# Patient Record
Sex: MALE | Race: WHITE | Employment: OTHER | URBAN - METROPOLITAN AREA
[De-identification: names, ages, dates, MRNs, and addresses within clinical notes are randomized per-mention and may not be internally consistent; named-entity substitution may affect disease eponyms.]

---

## 2017-02-13 PROBLEM — Z86.19 HISTORY OF SHINGLES: Status: ACTIVE | Noted: 2017-02-13

## 2018-01-22 PROBLEM — M47.819 SPINAL ARTHRITIS: Status: ACTIVE | Noted: 2018-01-22

## 2018-01-22 PROBLEM — I77.810 ASCENDING AORTA DILATATION (HCC): Status: ACTIVE | Noted: 2018-01-22

## 2018-01-22 PROBLEM — I77.810 ASCENDING AORTA DILATATION: Status: ACTIVE | Noted: 2018-01-22

## 2018-07-26 PROBLEM — M53.3 PAIN OF LEFT SACROILIAC JOINT: Status: ACTIVE | Noted: 2018-07-26

## 2018-11-06 ENCOUNTER — HOSPITAL ENCOUNTER (OUTPATIENT)
Facility: HOSPITAL | Age: 68
Setting detail: OBSERVATION
Discharge: HOME | End: 2018-11-07
Attending: EMERGENCY MEDICINE | Admitting: INTERNAL MEDICINE
Payer: MEDICARE

## 2018-11-06 ENCOUNTER — APPOINTMENT (OUTPATIENT)
Dept: GENERAL RADIOLOGY | Facility: HOSPITAL | Age: 68
End: 2018-11-06
Attending: EMERGENCY MEDICINE
Payer: MEDICARE

## 2018-11-06 DIAGNOSIS — R07.9 CHEST PAIN OF UNCERTAIN ETIOLOGY: Primary | ICD-10-CM

## 2018-11-06 PROCEDURE — 99285 EMERGENCY DEPT VISIT HI MDM: CPT

## 2018-11-06 PROCEDURE — 93010 ELECTROCARDIOGRAM REPORT: CPT

## 2018-11-06 PROCEDURE — 84484 ASSAY OF TROPONIN QUANT: CPT | Performed by: EMERGENCY MEDICINE

## 2018-11-06 PROCEDURE — 80053 COMPREHEN METABOLIC PANEL: CPT | Performed by: EMERGENCY MEDICINE

## 2018-11-06 PROCEDURE — 93005 ELECTROCARDIOGRAM TRACING: CPT

## 2018-11-06 PROCEDURE — 71045 X-RAY EXAM CHEST 1 VIEW: CPT | Performed by: EMERGENCY MEDICINE

## 2018-11-06 PROCEDURE — 85025 COMPLETE CBC W/AUTO DIFF WBC: CPT | Performed by: EMERGENCY MEDICINE

## 2018-11-06 PROCEDURE — 36415 COLL VENOUS BLD VENIPUNCTURE: CPT

## 2018-11-06 PROCEDURE — 84484 ASSAY OF TROPONIN QUANT: CPT | Performed by: INTERNAL MEDICINE

## 2018-11-06 RX ORDER — ACETAMINOPHEN 325 MG/1
650 TABLET ORAL EVERY 6 HOURS PRN
Status: DISCONTINUED | OUTPATIENT
Start: 2018-11-06 | End: 2018-11-07

## 2018-11-06 RX ORDER — NITROGLYCERIN 0.4 MG/1
0.4 TABLET SUBLINGUAL EVERY 5 MIN PRN
Status: DISCONTINUED | OUTPATIENT
Start: 2018-11-06 | End: 2018-11-06

## 2018-11-06 RX ORDER — ZOLPIDEM TARTRATE 10 MG/1
10 TABLET ORAL NIGHTLY PRN
Status: DISCONTINUED | OUTPATIENT
Start: 2018-11-06 | End: 2018-11-07

## 2018-11-06 RX ORDER — HEPARIN SODIUM 5000 [USP'U]/ML
5000 INJECTION, SOLUTION INTRAVENOUS; SUBCUTANEOUS EVERY 8 HOURS SCHEDULED
Status: DISCONTINUED | OUTPATIENT
Start: 2018-11-06 | End: 2018-11-07

## 2018-11-06 RX ORDER — PROPRANOLOL/HYDROCHLOROTHIAZID 40 MG-25MG
500 TABLET ORAL DAILY
COMMUNITY

## 2018-11-06 RX ORDER — ACETYLCARNITINE 500 MG
500 CAPSULE ORAL DAILY
COMMUNITY
End: 2021-02-24

## 2018-11-06 RX ORDER — ASPIRIN 325 MG
325 TABLET ORAL DAILY
COMMUNITY

## 2018-11-06 RX ORDER — NITROGLYCERIN 0.4 MG/1
0.4 TABLET SUBLINGUAL EVERY 5 MIN PRN
Status: DISCONTINUED | OUTPATIENT
Start: 2018-11-06 | End: 2018-11-07

## 2018-11-06 RX ORDER — ASPIRIN 81 MG/1
162 TABLET, CHEWABLE ORAL ONCE
Status: COMPLETED | OUTPATIENT
Start: 2018-11-06 | End: 2018-11-06

## 2018-11-06 RX ORDER — ONDANSETRON 2 MG/ML
4 INJECTION INTRAMUSCULAR; INTRAVENOUS EVERY 6 HOURS PRN
Status: DISCONTINUED | OUTPATIENT
Start: 2018-11-06 | End: 2018-11-07

## 2018-11-06 RX ORDER — ASPIRIN 325 MG
325 TABLET ORAL DAILY
Status: DISCONTINUED | OUTPATIENT
Start: 2018-11-07 | End: 2018-11-07

## 2018-11-06 RX ORDER — METOCLOPRAMIDE HYDROCHLORIDE 5 MG/ML
10 INJECTION INTRAMUSCULAR; INTRAVENOUS EVERY 8 HOURS PRN
Status: DISCONTINUED | OUTPATIENT
Start: 2018-11-06 | End: 2018-11-07

## 2018-11-06 NOTE — ED PROVIDER NOTES
Patient Seen in: [facility] Emergency Department    History   Patient presents with:  Chest Pain Angina (cardiovascular)    Stated Complaint: chest pain    HPI    Zenaida Denney is a pleasant 47-year-old gentleman with a history of quadruple bypass proximal HISTORY      Acromioplasty and resection   • OTHER SURGICAL HISTORY  2003    Finger repair           Social History    Tobacco Use      Smoking status: Never Smoker      Smokeless tobacco: Never Used    Alcohol use:  Yes      Alcohol/week: 3.6 oz      Types The following orders were created for panel order CBC WITH DIFFERENTIAL WITH PLATELET.   Procedure                               Abnormality         Status                     ---------                               -----------         ------

## 2018-11-06 NOTE — ED INITIAL ASSESSMENT (HPI)
Pt c/o intermittent chest pain since last Thursday, pt took nitro and it went away then. Pt states the pain has not fully resided. Pt states he took a 2 baby aspirins today around 0800.  Pt denies any SOB

## 2018-11-06 NOTE — H&P
[facility] Hospitalist History and Physical      Patient presents with:  Chest Pain Angina (cardiovascular)       PCP: Lisa Morris MD      History of Present Illness: Patient is a 69year old male with PMH sig for cad s/p cabg 2006.  Gerd, htn, and HL who prese 500 mg by mouth daily. Acetylcarnitine HCl (ACETYL L-CARNITINE) 500 MG Oral Cap Take 500 mg by mouth daily. aspirin 81 MG Oral Tab EC Take 162 mg by mouth daily. B Complex-C-Folic Acid Oral Tab Take 1 tablet by mouth daily.    Zolpidem Tartrate 10 MG (36.4 °C) (Temporal)   Resp 12   Ht 6' 1\" (1.854 m)   Wt 187 lb (84.8 kg)   SpO2 98%   BMI 24.67 kg/m²   General:  Alert, no distress, appears stated age. Head:  Normocephalic, without obvious abnormality, atraumatic.    Eyes:  Sclera anicteric, No con seen.  No pneumothorax. CONCLUSION:  No acute cardiopulmonary disease.      Dictated by: Yasmani Nelson MD on 11/06/2018 at 16:59     Approved by: Yasmani Nelson MD               Assessment/Plan:     Patient is a 69year old male with PMH sig for cad s/p cab

## 2018-11-07 ENCOUNTER — APPOINTMENT (OUTPATIENT)
Dept: INTERVENTIONAL RADIOLOGY/VASCULAR | Facility: HOSPITAL | Age: 68
End: 2018-11-07
Attending: INTERNAL MEDICINE
Payer: MEDICARE

## 2018-11-07 ENCOUNTER — APPOINTMENT (OUTPATIENT)
Dept: CV DIAGNOSTICS | Facility: HOSPITAL | Age: 68
End: 2018-11-07
Attending: INTERNAL MEDICINE
Payer: MEDICARE

## 2018-11-07 VITALS
TEMPERATURE: 98 F | DIASTOLIC BLOOD PRESSURE: 79 MMHG | HEIGHT: 73 IN | BODY MASS INDEX: 22.59 KG/M2 | WEIGHT: 170.44 LBS | RESPIRATION RATE: 14 BRPM | SYSTOLIC BLOOD PRESSURE: 123 MMHG | OXYGEN SATURATION: 97 % | HEART RATE: 50 BPM

## 2018-11-07 PROCEDURE — 85025 COMPLETE CBC W/AUTO DIFF WBC: CPT | Performed by: INTERNAL MEDICINE

## 2018-11-07 PROCEDURE — 84484 ASSAY OF TROPONIN QUANT: CPT | Performed by: INTERNAL MEDICINE

## 2018-11-07 PROCEDURE — 99152 MOD SED SAME PHYS/QHP 5/>YRS: CPT

## 2018-11-07 PROCEDURE — B2181ZZ FLUOROSCOPY OF LEFT INTERNAL MAMMARY BYPASS GRAFT USING LOW OSMOLAR CONTRAST: ICD-10-PCS | Performed by: INTERNAL MEDICINE

## 2018-11-07 PROCEDURE — 99153 MOD SED SAME PHYS/QHP EA: CPT

## 2018-11-07 PROCEDURE — 80048 BASIC METABOLIC PNL TOTAL CA: CPT | Performed by: INTERNAL MEDICINE

## 2018-11-07 PROCEDURE — 80061 LIPID PANEL: CPT | Performed by: INTERNAL MEDICINE

## 2018-11-07 PROCEDURE — B2111ZZ FLUOROSCOPY OF MULTIPLE CORONARY ARTERIES USING LOW OSMOLAR CONTRAST: ICD-10-PCS | Performed by: INTERNAL MEDICINE

## 2018-11-07 PROCEDURE — 4A023N7 MEASUREMENT OF CARDIAC SAMPLING AND PRESSURE, LEFT HEART, PERCUTANEOUS APPROACH: ICD-10-PCS | Performed by: INTERNAL MEDICINE

## 2018-11-07 PROCEDURE — 93459 L HRT ART/GRFT ANGIO: CPT

## 2018-11-07 PROCEDURE — B2131ZZ FLUOROSCOPY OF MULTIPLE CORONARY ARTERY BYPASS GRAFTS USING LOW OSMOLAR CONTRAST: ICD-10-PCS | Performed by: INTERNAL MEDICINE

## 2018-11-07 PROCEDURE — B2151ZZ FLUOROSCOPY OF LEFT HEART USING LOW OSMOLAR CONTRAST: ICD-10-PCS | Performed by: INTERNAL MEDICINE

## 2018-11-07 RX ORDER — MIDAZOLAM HYDROCHLORIDE 1 MG/ML
INJECTION INTRAMUSCULAR; INTRAVENOUS
Status: COMPLETED
Start: 2018-11-07 | End: 2018-11-07

## 2018-11-07 RX ORDER — SODIUM CHLORIDE 9 MG/ML
INJECTION, SOLUTION INTRAVENOUS CONTINUOUS
Status: ACTIVE | OUTPATIENT
Start: 2018-11-07 | End: 2018-11-07

## 2018-11-07 RX ORDER — HEPARIN SODIUM 5000 [USP'U]/ML
INJECTION, SOLUTION INTRAVENOUS; SUBCUTANEOUS
Status: COMPLETED
Start: 2018-11-07 | End: 2018-11-07

## 2018-11-07 RX ORDER — LIDOCAINE HYDROCHLORIDE 10 MG/ML
INJECTION, SOLUTION EPIDURAL; INFILTRATION; INTRACAUDAL; PERINEURAL
Status: COMPLETED
Start: 2018-11-07 | End: 2018-11-07

## 2018-11-07 NOTE — PROCEDURES
[facility] Cardiology      Procedure:  LHC, CORONARY ANGIO, LV ANGIO, SVG/LIMA ANGIO                         PERCLOSE RFA      Findings    LVEF: 50%    LM: NORMAL    LCx: 100% PROX    LAD: 100% PROX    RCA: 99% MID    SVG--> OM: PATENT WITH 65% OM STENOSIS (IN

## 2018-11-07 NOTE — PLAN OF CARE
Patient returned from cath. Right groin soft, dressing c/d/i.  LE +CMS, 2+pp. VSS. Patient is awake a/ox4, with spouse at bedside. IVF started. Patient encouraged to drink a lot of fluids.

## 2018-11-07 NOTE — CONSULTS
[facility] Cardiology Consultation    Carlo Larry Patient Status:  Observation    1950 MRN [id number]   [facility] 3NE-A Attending Mckenzie Eubanks MD   Hosp Day # 0 PCP Yuniel Ruiz MD     Reason for Consultation:  Chest pain, prio History:   Procedure Laterality Date   • APPENDECTOMY  2000   • CABG  2006    Four   • COLONOSCOPY  2014    Diverticula   • COLONOSCOPY  2004    No polyps   • COLONOSCOPY  8/22/2014    Procedure: COLONOSCOPY;  Surgeon:  Casie Connors MD;  Location: [facility] HGB  17.7*   PLT  250.0       Chem:  Recent Labs   Lab  11/06/18   1633   NA  140   K  3.8   CL  106   CO2  29.0   BUN  18   CREATSERUM  1.47*   CA  8.9   GLU  100*       Recent Labs   Lab  11/06/18   1633   ALT  39   AST  22   ALB  4.1       No results

## 2018-11-07 NOTE — PROGRESS NOTES
[facility] Hospitalist Progress Note                                                                   [address] [facility]  12/12/1950    SUBJECTIVE: Pt denies any complaints. No cp sob or dizziness.

## 2018-11-07 NOTE — PROCEDURES
[address]    PATIENT'S NAME: [facility], JIGNA SIDDIQI   ATTENDING PHYSICIAN: Isiah Camargo DO   OPERATING PHYSICIAN: Ja Smith M.D.    PATIENT ACCOUNT#:   [de-identified]    LOCATION:  [address] [place] [address]  MEDICAL RECORD #:   [id number]       DATE OF which is 99% occluded in its midportion. GRAFT ANGIOGRAPHY:  The saphenous vein graft to the OM is widely patent. The OM itself is a medium-sized vessel.   Several centimeters beyond the vein graft artery anastomosis in a very tortuous segment of the OM

## 2018-11-07 NOTE — PROGRESS NOTES
[facility] Cardiology Progress Note        [facility] Patient Status:  Observation    1950 MRN [id number]   [facility] 3NE-A Attending Eyad Quan MD   Hosp Day # 0 PCP MD Jodie Velasquez 141   K  3.8  4.4   CL  106  108   CO2  29.0  28.0   BUN  18  16   CREATSERUM  1.47*  1.22   CA  8.9  8.3   GLU  100*  83       Recent Labs   Lab  11/06/18   1633   ALT  39   AST  22   ALB  4.1       No results for input(s): PT, PTT, INR in the last 168 ho

## 2018-11-08 NOTE — PLAN OF CARE
NURSING DISCHARGE NOTE    Discharged Home via Wheelchair. Accompanied by Support staff  Belongings Taken by patient/family     No complications s/p cath. PP 2+, right groin soft. Patient got up and walked with no difficulty. VSS.  Dischage instructi

## 2018-11-19 PROBLEM — M25.552 LEFT HIP PAIN: Status: ACTIVE | Noted: 2018-11-19

## 2020-01-15 PROBLEM — M25.512 BILATERAL SHOULDER PAIN, UNSPECIFIED CHRONICITY: Status: ACTIVE | Noted: 2020-01-15

## 2020-01-15 PROBLEM — M46.90 INFLAMMATORY SPONDYLOPATHY, UNSPECIFIED SPINAL REGION (HCC): Status: ACTIVE | Noted: 2020-01-15

## 2020-01-15 PROBLEM — M46.90 INFLAMMATORY SPONDYLOPATHY, UNSPECIFIED SPINAL REGION: Status: ACTIVE | Noted: 2020-01-15

## 2020-01-15 PROBLEM — M25.511 BILATERAL SHOULDER PAIN, UNSPECIFIED CHRONICITY: Status: ACTIVE | Noted: 2020-01-15

## 2020-01-29 PROBLEM — M54.12 LEFT CERVICAL RADICULOPATHY: Status: ACTIVE | Noted: 2020-01-29

## 2020-01-29 PROBLEM — M75.101 ROTATOR CUFF SYNDROME, RIGHT: Status: ACTIVE | Noted: 2020-01-29

## 2020-01-30 PROBLEM — M25.511 ACUTE PAIN OF RIGHT SHOULDER: Status: ACTIVE | Noted: 2020-01-30

## 2021-04-11 DIAGNOSIS — Z23 NEED FOR VACCINATION: ICD-10-CM

## 2022-03-02 PROBLEM — IMO0001 PATIENT IS JEHOVAH'S WITNESS: Status: ACTIVE | Noted: 2022-03-02

## 2022-03-02 PROBLEM — M19.041 PRIMARY OSTEOARTHRITIS OF BOTH HANDS: Status: ACTIVE | Noted: 2022-03-02

## 2022-03-02 PROBLEM — M25.511 ACUTE PAIN OF RIGHT SHOULDER: Status: RESOLVED | Noted: 2020-01-30 | Resolved: 2022-03-02

## 2022-03-02 PROBLEM — M75.101 ROTATOR CUFF SYNDROME, RIGHT: Status: RESOLVED | Noted: 2020-01-29 | Resolved: 2022-03-02

## 2022-03-02 PROBLEM — M19.042 PRIMARY OSTEOARTHRITIS OF BOTH HANDS: Status: ACTIVE | Noted: 2022-03-02

## 2022-09-22 ENCOUNTER — APPOINTMENT (OUTPATIENT)
Dept: GENERAL RADIOLOGY | Facility: HOSPITAL | Age: 72
End: 2022-09-22
Attending: EMERGENCY MEDICINE
Payer: MEDICARE

## 2022-09-22 ENCOUNTER — HOSPITAL ENCOUNTER (INPATIENT)
Facility: HOSPITAL | Age: 72
LOS: 1 days | Discharge: HOME | End: 2022-09-23
Attending: EMERGENCY MEDICINE | Admitting: HOSPITALIST
Payer: MEDICARE

## 2022-09-22 DIAGNOSIS — R07.9 ACUTE CHEST PAIN: Primary | ICD-10-CM

## 2022-09-22 LAB
ALBUMIN SERPL-MCNC: 3.9 G/DL (ref 3.4–5)
ALBUMIN/GLOB SERPL: 1 {RATIO} (ref 1–2)
ALP LIVER SERPL-CCNC: 80 U/L
ALT SERPL-CCNC: 28 U/L
ANION GAP SERPL CALC-SCNC: 6 MMOL/L (ref 0–18)
APTT PPP: 26.9 SECONDS (ref 23.3–35.6)
APTT PPP: 91.1 SECONDS (ref 23.3–35.6)
AST SERPL-CCNC: 20 U/L (ref 15–37)
BASOPHILS # BLD AUTO: 0.06 X10(3) UL (ref 0–0.2)
BASOPHILS NFR BLD AUTO: 0.9 %
BILIRUB SERPL-MCNC: 0.4 MG/DL (ref 0.1–2)
BUN BLD-MCNC: 19 MG/DL (ref 7–18)
CALCIUM BLD-MCNC: 9.5 MG/DL (ref 8.5–10.1)
CHLORIDE SERPL-SCNC: 108 MMOL/L (ref 98–112)
CHOLEST SERPL-MCNC: 222 MG/DL (ref ?–200)
CO2 SERPL-SCNC: 26 MMOL/L (ref 21–32)
CREAT BLD-MCNC: 1.12 MG/DL
EOSINOPHIL # BLD AUTO: 0.54 X10(3) UL (ref 0–0.7)
EOSINOPHIL NFR BLD AUTO: 8.5 %
ERYTHROCYTE [DISTWIDTH] IN BLOOD BY AUTOMATED COUNT: 11.5 %
GFR SERPLBLD BASED ON 1.73 SQ M-ARVRAT: 69 ML/MIN/1.73M2 (ref 60–?)
GLOBULIN PLAS-MCNC: 3.9 G/DL (ref 2.8–4.4)
GLUCOSE BLD-MCNC: 92 MG/DL (ref 70–99)
HCT VFR BLD AUTO: 51.6 %
HDLC SERPL-MCNC: 56 MG/DL (ref 40–59)
HGB BLD-MCNC: 17.5 G/DL
IMM GRANULOCYTES # BLD AUTO: 0.02 X10(3) UL (ref 0–1)
IMM GRANULOCYTES NFR BLD: 0.3 %
INR BLD: 1.01 (ref 0.85–1.16)
LDLC SERPL CALC-MCNC: 144 MG/DL (ref ?–100)
LYMPHOCYTES # BLD AUTO: 1.81 X10(3) UL (ref 1–4)
LYMPHOCYTES NFR BLD AUTO: 28.4 %
MAGNESIUM SERPL-MCNC: 2.2 MG/DL (ref 1.6–2.6)
MCH RBC QN AUTO: 29.9 PG (ref 26–34)
MCHC RBC AUTO-ENTMCNC: 33.9 G/DL (ref 31–37)
MCV RBC AUTO: 88.2 FL
MONOCYTES # BLD AUTO: 0.7 X10(3) UL (ref 0.1–1)
MONOCYTES NFR BLD AUTO: 11 %
NEUTROPHILS # BLD AUTO: 3.24 X10 (3) UL (ref 1.5–7.7)
NEUTROPHILS # BLD AUTO: 3.24 X10(3) UL (ref 1.5–7.7)
NEUTROPHILS NFR BLD AUTO: 50.9 %
NONHDLC SERPL-MCNC: 166 MG/DL (ref ?–130)
OSMOLALITY SERPL CALC.SUM OF ELEC: 292 MOSM/KG (ref 275–295)
PLATELET # BLD AUTO: 245 10(3)UL (ref 150–450)
POTASSIUM SERPL-SCNC: 4.2 MMOL/L (ref 3.5–5.1)
PROT SERPL-MCNC: 7.8 G/DL (ref 6.4–8.2)
PROTHROMBIN TIME: 13.3 SECONDS (ref 11.6–14.8)
RBC # BLD AUTO: 5.85 X10(6)UL
SARS-COV-2 RNA RESP QL NAA+PROBE: NOT DETECTED
SODIUM SERPL-SCNC: 140 MMOL/L (ref 136–145)
TRIGL SERPL-MCNC: 126 MG/DL (ref 30–149)
TROPONIN I HIGH SENSITIVITY: 120 NG/L
TROPONIN I HIGH SENSITIVITY: 128 NG/L
VLDLC SERPL CALC-MCNC: 24 MG/DL (ref 0–30)
WBC # BLD AUTO: 6.4 X10(3) UL (ref 4–11)

## 2022-09-22 PROCEDURE — 85610 PROTHROMBIN TIME: CPT | Performed by: EMERGENCY MEDICINE

## 2022-09-22 PROCEDURE — 84484 ASSAY OF TROPONIN QUANT: CPT | Performed by: EMERGENCY MEDICINE

## 2022-09-22 PROCEDURE — 71045 X-RAY EXAM CHEST 1 VIEW: CPT | Performed by: EMERGENCY MEDICINE

## 2022-09-22 PROCEDURE — 96365 THER/PROPH/DIAG IV INF INIT: CPT

## 2022-09-22 PROCEDURE — 84484 ASSAY OF TROPONIN QUANT: CPT | Performed by: INTERNAL MEDICINE

## 2022-09-22 PROCEDURE — 93005 ELECTROCARDIOGRAM TRACING: CPT

## 2022-09-22 PROCEDURE — 83735 ASSAY OF MAGNESIUM: CPT | Performed by: HOSPITALIST

## 2022-09-22 PROCEDURE — 99285 EMERGENCY DEPT VISIT HI MDM: CPT

## 2022-09-22 PROCEDURE — 85730 THROMBOPLASTIN TIME PARTIAL: CPT | Performed by: EMERGENCY MEDICINE

## 2022-09-22 PROCEDURE — 80053 COMPREHEN METABOLIC PANEL: CPT | Performed by: EMERGENCY MEDICINE

## 2022-09-22 PROCEDURE — 85025 COMPLETE CBC W/AUTO DIFF WBC: CPT | Performed by: EMERGENCY MEDICINE

## 2022-09-22 PROCEDURE — 93010 ELECTROCARDIOGRAM REPORT: CPT

## 2022-09-22 PROCEDURE — 80061 LIPID PANEL: CPT | Performed by: EMERGENCY MEDICINE

## 2022-09-22 RX ORDER — ACETAMINOPHEN 500 MG
500 TABLET ORAL EVERY 4 HOURS PRN
Status: DISCONTINUED | OUTPATIENT
Start: 2022-09-22 | End: 2022-09-23

## 2022-09-22 RX ORDER — ASCORBIC ACID 500 MG
1000 TABLET ORAL DAILY
Status: DISCONTINUED | OUTPATIENT
Start: 2022-09-23 | End: 2022-09-23

## 2022-09-22 RX ORDER — ASPIRIN 81 MG/1
81 TABLET, CHEWABLE ORAL DAILY
Status: DISCONTINUED | OUTPATIENT
Start: 2022-09-22 | End: 2022-09-23

## 2022-09-22 RX ORDER — ASPIRIN 81 MG/1
324 TABLET, CHEWABLE ORAL ONCE
Status: DISCONTINUED | OUTPATIENT
Start: 2022-09-22 | End: 2022-09-22

## 2022-09-22 RX ORDER — HEPARIN SODIUM AND DEXTROSE 10000; 5 [USP'U]/100ML; G/100ML
INJECTION INTRAVENOUS CONTINUOUS
Status: DISCONTINUED | OUTPATIENT
Start: 2022-09-22 | End: 2022-09-23

## 2022-09-22 RX ORDER — NITROGLYCERIN 0.4 MG/1
0.4 TABLET SUBLINGUAL EVERY 5 MIN PRN
COMMUNITY

## 2022-09-22 RX ORDER — MELATONIN
1000 DAILY
Status: DISCONTINUED | OUTPATIENT
Start: 2022-09-23 | End: 2022-09-23

## 2022-09-22 RX ORDER — DEXTROSE AND SODIUM CHLORIDE 5; .45 G/100ML; G/100ML
INJECTION, SOLUTION INTRAVENOUS CONTINUOUS
Status: ACTIVE | OUTPATIENT
Start: 2022-09-22 | End: 2022-09-22

## 2022-09-22 RX ORDER — HEPARIN SODIUM AND DEXTROSE 10000; 5 [USP'U]/100ML; G/100ML
12 INJECTION INTRAVENOUS ONCE
Status: COMPLETED | OUTPATIENT
Start: 2022-09-22 | End: 2022-09-22

## 2022-09-22 RX ORDER — HEPARIN SODIUM 1000 [USP'U]/ML
5000 INJECTION, SOLUTION INTRAVENOUS; SUBCUTANEOUS ONCE
Status: COMPLETED | OUTPATIENT
Start: 2022-09-22 | End: 2022-09-22

## 2022-09-22 NOTE — ED INITIAL ASSESSMENT (HPI)
Patient presents for evaluation of chest discomfort that started last night. He describes it as a mild pressure or indigestion like feeling and took three nitroglycerin 20 minutes apart without relief. He states he still feels uneasy this morning.

## 2022-09-22 NOTE — ED QUICK NOTES
Orders for admission, patient is aware of plan and ready to go upstairs. Any questions, please call ED RN Efrain Zacarias at extension [phone].      Patient Covid vaccination status: Fully vaccinated within the last two weeks; possibly not fully immune     COVID Test Ordered in ED: Rapid SARS-CoV-2 by PCR    COVID Suspicion at Admission: No risk with negative rapid    Running Infusions:      Mental Status/LOC at time of transport: A&Ox4    Other pertinent information:   CIWA score: N/A   NIH score:  N/A

## 2022-09-22 NOTE — PROGRESS NOTES
NURSING ADMISSION NOTE      Patient admitted via Cart  Oriented to room. Safety precautions initiated. Bed in low position. Call light in reach. Patient alert and oriented x 4. Up with standby assist. On RA. SB on tele. Continent of bowel and bladder. No complaints of pain, shortness of breath, or chest pain/discomfort. On hepain gtt. POC: [facility] tomorrow. Fall precautions in place. Call light within reach.

## 2022-09-23 ENCOUNTER — APPOINTMENT (OUTPATIENT)
Dept: INTERVENTIONAL RADIOLOGY/VASCULAR | Facility: HOSPITAL | Age: 72
End: 2022-09-23
Attending: INTERNAL MEDICINE
Payer: MEDICARE

## 2022-09-23 VITALS
BODY MASS INDEX: 23.96 KG/M2 | OXYGEN SATURATION: 98 % | DIASTOLIC BLOOD PRESSURE: 74 MMHG | SYSTOLIC BLOOD PRESSURE: 122 MMHG | RESPIRATION RATE: 23 BRPM | HEART RATE: 59 BPM | WEIGHT: 180.75 LBS | HEIGHT: 73 IN | TEMPERATURE: 98 F

## 2022-09-23 LAB
ALBUMIN SERPL-MCNC: 3.4 G/DL (ref 3.4–5)
ALBUMIN/GLOB SERPL: 1.1 {RATIO} (ref 1–2)
ALP LIVER SERPL-CCNC: 67 U/L
ALT SERPL-CCNC: 25 U/L
ANION GAP SERPL CALC-SCNC: 4 MMOL/L (ref 0–18)
APTT PPP: 67.2 SECONDS (ref 23.3–35.6)
AST SERPL-CCNC: 15 U/L (ref 15–37)
ATRIAL RATE: 73 BPM
BASOPHILS # BLD AUTO: 0.06 X10(3) UL (ref 0–0.2)
BASOPHILS NFR BLD AUTO: 1 %
BILIRUB SERPL-MCNC: 0.6 MG/DL (ref 0.1–2)
BUN BLD-MCNC: 16 MG/DL (ref 7–18)
CALCIUM BLD-MCNC: 8.7 MG/DL (ref 8.5–10.1)
CHLORIDE SERPL-SCNC: 111 MMOL/L (ref 98–112)
CO2 SERPL-SCNC: 26 MMOL/L (ref 21–32)
CREAT BLD-MCNC: 0.97 MG/DL
EOSINOPHIL # BLD AUTO: 0.8 X10(3) UL (ref 0–0.7)
EOSINOPHIL NFR BLD AUTO: 13.7 %
ERYTHROCYTE [DISTWIDTH] IN BLOOD BY AUTOMATED COUNT: 11.7 %
GFR SERPLBLD BASED ON 1.73 SQ M-ARVRAT: 82 ML/MIN/1.73M2 (ref 60–?)
GLOBULIN PLAS-MCNC: 3 G/DL (ref 2.8–4.4)
GLUCOSE BLD-MCNC: 90 MG/DL (ref 70–99)
HCT VFR BLD AUTO: 48.5 %
HGB BLD-MCNC: 16.5 G/DL
IMM GRANULOCYTES # BLD AUTO: 0.03 X10(3) UL (ref 0–1)
IMM GRANULOCYTES NFR BLD: 0.5 %
LYMPHOCYTES # BLD AUTO: 1.98 X10(3) UL (ref 1–4)
LYMPHOCYTES NFR BLD AUTO: 33.8 %
MCH RBC QN AUTO: 30.7 PG (ref 26–34)
MCHC RBC AUTO-ENTMCNC: 34 G/DL (ref 31–37)
MCV RBC AUTO: 90.3 FL
MONOCYTES # BLD AUTO: 0.68 X10(3) UL (ref 0.1–1)
MONOCYTES NFR BLD AUTO: 11.6 %
NEUTROPHILS # BLD AUTO: 2.31 X10 (3) UL (ref 1.5–7.7)
NEUTROPHILS # BLD AUTO: 2.31 X10(3) UL (ref 1.5–7.7)
NEUTROPHILS NFR BLD AUTO: 39.4 %
OSMOLALITY SERPL CALC.SUM OF ELEC: 293 MOSM/KG (ref 275–295)
P AXIS: 71 DEGREES
P-R INTERVAL: 148 MS
PLATELET # BLD AUTO: 208 10(3)UL (ref 150–450)
POTASSIUM SERPL-SCNC: 3.9 MMOL/L (ref 3.5–5.1)
PROT SERPL-MCNC: 6.4 G/DL (ref 6.4–8.2)
Q-T INTERVAL: 374 MS
QRS DURATION: 110 MS
QTC CALCULATION (BEZET): 412 MS
R AXIS: -8 DEGREES
RBC # BLD AUTO: 5.37 X10(6)UL
SODIUM SERPL-SCNC: 141 MMOL/L (ref 136–145)
T AXIS: 39 DEGREES
TROPONIN I HIGH SENSITIVITY: 119 NG/L
VENTRICULAR RATE: 73 BPM
WBC # BLD AUTO: 5.9 X10(3) UL (ref 4–11)

## 2022-09-23 PROCEDURE — B2181ZZ FLUOROSCOPY OF LEFT INTERNAL MAMMARY BYPASS GRAFT USING LOW OSMOLAR CONTRAST: ICD-10-PCS | Performed by: INTERNAL MEDICINE

## 2022-09-23 PROCEDURE — 80053 COMPREHEN METABOLIC PANEL: CPT | Performed by: HOSPITALIST

## 2022-09-23 PROCEDURE — 99152 MOD SED SAME PHYS/QHP 5/>YRS: CPT | Performed by: INTERNAL MEDICINE

## 2022-09-23 PROCEDURE — 99153 MOD SED SAME PHYS/QHP EA: CPT | Performed by: INTERNAL MEDICINE

## 2022-09-23 PROCEDURE — 84484 ASSAY OF TROPONIN QUANT: CPT | Performed by: INTERNAL MEDICINE

## 2022-09-23 PROCEDURE — 93459 L HRT ART/GRFT ANGIO: CPT | Performed by: INTERNAL MEDICINE

## 2022-09-23 PROCEDURE — 85730 THROMBOPLASTIN TIME PARTIAL: CPT | Performed by: INTERNAL MEDICINE

## 2022-09-23 PROCEDURE — B2131ZZ FLUOROSCOPY OF MULTIPLE CORONARY ARTERY BYPASS GRAFTS USING LOW OSMOLAR CONTRAST: ICD-10-PCS | Performed by: INTERNAL MEDICINE

## 2022-09-23 PROCEDURE — B2111ZZ FLUOROSCOPY OF MULTIPLE CORONARY ARTERIES USING LOW OSMOLAR CONTRAST: ICD-10-PCS | Performed by: INTERNAL MEDICINE

## 2022-09-23 PROCEDURE — B2151ZZ FLUOROSCOPY OF LEFT HEART USING LOW OSMOLAR CONTRAST: ICD-10-PCS | Performed by: INTERNAL MEDICINE

## 2022-09-23 PROCEDURE — 85025 COMPLETE CBC W/AUTO DIFF WBC: CPT | Performed by: HOSPITALIST

## 2022-09-23 PROCEDURE — 4A023N7 MEASUREMENT OF CARDIAC SAMPLING AND PRESSURE, LEFT HEART, PERCUTANEOUS APPROACH: ICD-10-PCS | Performed by: INTERNAL MEDICINE

## 2022-09-23 RX ORDER — ASPIRIN 81 MG/1
81 TABLET, CHEWABLE ORAL DAILY
Qty: 90 TABLET | Refills: 3 | Status: SHIPPED | OUTPATIENT
Start: 2022-09-24

## 2022-09-23 RX ORDER — LIDOCAINE HYDROCHLORIDE 10 MG/ML
INJECTION, SOLUTION EPIDURAL; INFILTRATION; INTRACAUDAL; PERINEURAL
Status: COMPLETED
Start: 2022-09-23 | End: 2022-09-23

## 2022-09-23 RX ORDER — HEPARIN SODIUM 5000 [USP'U]/ML
INJECTION, SOLUTION INTRAVENOUS; SUBCUTANEOUS
Status: COMPLETED
Start: 2022-09-23 | End: 2022-09-23

## 2022-09-23 RX ORDER — MIDAZOLAM HYDROCHLORIDE 1 MG/ML
INJECTION INTRAMUSCULAR; INTRAVENOUS
Status: COMPLETED
Start: 2022-09-23 | End: 2022-09-23

## 2022-09-23 NOTE — PROCEDURES
[facility] Cardiology      Procedure:  LHC, CORONARY ANGIO, LV ANGIO, SVG/LIMA ANGIO                         PERCLOSE RFA      Findings    LVEF: 60%    LM: NORMAL    LCx: 100% PROX    LAD: 100% PROX    RCA: 100% MID    SVG--> OM: PATENT  SVG--> RCA: PATENT  LIMA--> LAD : PATENT  SVG--> DIAG: NOT VISUALIZED. 100% PER CATH 2018

## 2022-09-23 NOTE — PROCEDURES
[facility]    PATIENT'S NAME: Patricia Douglas   ATTENDING PHYSICIAN: Galo Ruggiero M.D. OPERATING PHYSICIAN: Eros Barry M.D. PATIENT ACCOUNT#:   [de-identified]    LOCATION:  [address] [place] [address]  MEDICAL RECORD #:   [id number]       YOB: 1950  ADMISSION DATE:       09/22/2022      OPERATION DATE:  09/23/2022    CARDIAC PROCEDURE TRANSCRIPTION      CARDIAC CATHETERIZATION    PREOPERATIVE DIAGNOSIS:    POSTOPERATIVE DIAGNOSIS:    PROCEDURE PERFORMED:      CLINICAL SUMMARY:  The patient is a 47-year-old gentleman with known coronary disease and history of bypass surgery in the past.  He presents with chest pain and a modest elevation in his troponins. He was advised to undergo cardiac catheterization. DESCRIPTION OF PROCEDURE:  The procedure consisted of left heart catheterization, left ventriculography, coronary angiography, angiography of the saphenous vein grafts and LIMA. Moderate sedation was employed using 2 mg of IV Versed, 50 mcg of IV fentanyl. A trained professional under my supervision and I observed the patient from 1012 until [phone] for a total of 30 minutes. Hemodynamics were measured using a pigtail catheter advanced from the right femoral artery to the ascending aorta and then across the aortic valve into the left ventricle. Pressure was measured at each of those locations. Selective angiography of the left coronary artery, right coronary artery, saphenous vein grafts, and LIMA was performed using the following catheters:  6-Eritrean 4 left Charlene, 6-Eritrean 4 right Charlene catheter for the right coronary artery and OM graft, a 6-Eritrean right bypass catheter for the RCA graft, and a LIMA catheter for the left internal mammary artery. At the end of the procedure, the right femoral artery puncture site was closed using a Perclose device without difficulty. HEMODYNAMICS:  The left ventricular end-diastolic pressure is 15.     LEFT VENTRICULOGRAPHY:  The procedure was performed in the 30-degree WARD projection. This demonstrated normal left ventricular size and systolic function. Ejection fraction approximately 60%. There were no segmental wall motion defects. CORONARY ANGIOGRAPHY:  The left main segment of the left coronary artery is normal.  The left circumflex artery is 100% occluded. The LAD is 100% occluded following a large first septal  branch. There is a medium-sized vessel which appears to be either a high OM or ramus intermedius artery. This vessel has a proximal narrowing of 80% to 90%, but this is largely unchanged from the angiogram of 2018. On his right coronary artery angiography, the vessel is medium-sized, anatomically dominant. It is 100% occluded in its midportion. GRAFT ANGIOGRAPHY:  The saphenous vein graft to the obtuse marginal artery is patent throughout its course. The obtuse marginal artery is medium to large in caliber. It has modest narrowing in its midportion beyond the vein-artery anastomosis, where it is narrowed by perhaps 35% in several locations. The saphenous vein graft to the right coronary artery is patent throughout its course. It is anastomosed to the distal portion of a dominant right coronary artery. One sees a medium-sized PDA and medium-sized posterior left ventricular branch during graft injections. No high-grade stenoses are seen in the system. The saphenous vein graft to the diagonal artery was found to be 100% occluded in 2018; it was not re-visualized. The left internal mammary artery is anastomosed to a medium-sized diagonal branch. The LIMA itself is widely patent as is the LAD beyond the LIMA-LAD anastomosis. IMPRESSION:    1. Normal left ventricular diastolic function. 2.   Normal left ventricular size and systolic function. 3.   Coronary disease as described above with wide patency of the saphenous vein graft to the OM, saphenous vein graft to the RCA, and LIMA to the LAD.     Dictated By Kristin Powers M.D.  d: 09/23/2022 11:09:25  t: 09/23/2022 11:41:22  [facility] [id number]  [facility]/

## 2022-09-23 NOTE — PLAN OF CARE
Assumed care of the pt during the nightshift. Pt alert and oriented on room air. Heparin gtt per ACS protocol. Pt to go for Heart cath/PCI in the AM. No complaints of chest pain over night. Vital signs remain stable. See flowsheets for further assessment.

## 2022-09-23 NOTE — PLAN OF CARE
Assumed care for patient at 0730. Patient is A&O X4. NSR on tele. Pt on room air. Pt Denies any pain at this time. Pt is continent of B&B. Pt is a standby assist.  Pt went to cath lab @1000 and had a right femoral angio seal.  Pt will remain on bedrest til 1500. Call light within reach. Problem: CARDIOVASCULAR - ADULT  Goal: Maintains optimal cardiac output and hemodynamic stability  Description: INTERVENTIONS:  - Monitor vital signs, rhythm, and trends  - Monitor for bleeding, hypotension and signs of decreased cardiac output  - Evaluate effectiveness of vasoactive medications to optimize hemodynamic stability  - Monitor arterial and/or venous puncture sites for bleeding and/or hematoma  - Assess quality of pulses, skin color and temperature  - Assess for signs of decreased coronary artery perfusion - ex.  Angina  - Evaluate fluid balance, assess for edema, trend weights  Outcome: Progressing

## 2022-09-23 NOTE — PLAN OF CARE
Pt discharged to home. Being driven by spouse. Reviewed all meds with pt, instructed to follow up with providers. Discharge education for post angiogram reviewed. States understanding of all dc teaching. PIV removed. Off floor via wheelchair accompanied by this RN.

## 2025-08-09 ENCOUNTER — APPOINTMENT (OUTPATIENT)
Dept: INTERVENTIONAL RADIOLOGY/VASCULAR | Facility: HOSPITAL | Age: 75
End: 2025-08-09
Attending: INTERNAL MEDICINE

## 2025-08-09 ENCOUNTER — HOSPITAL ENCOUNTER (INPATIENT)
Facility: HOSPITAL | Age: 75
LOS: 1 days | Discharge: HOME | End: 2025-08-11
Attending: EMERGENCY MEDICINE | Admitting: INTERNAL MEDICINE

## 2025-08-09 ENCOUNTER — APPOINTMENT (OUTPATIENT)
Dept: GENERAL RADIOLOGY | Facility: HOSPITAL | Age: 75
End: 2025-08-09
Attending: EMERGENCY MEDICINE

## 2025-08-09 DIAGNOSIS — I21.3 ST ELEVATION MYOCARDIAL INFARCTION (STEMI), UNSPECIFIED ARTERY (HCC): Primary | ICD-10-CM

## 2025-08-09 LAB
ALBUMIN SERPL-MCNC: 4.5 G/DL (ref 3.2–4.8)
ALBUMIN/GLOB SERPL: 1.6 (ref 1–2)
ALP LIVER SERPL-CCNC: 81 U/L (ref 45–117)
ALT SERPL-CCNC: 22 U/L (ref 10–49)
ANION GAP SERPL CALC-SCNC: 11 MMOL/L (ref 0–18)
APTT PPP: 25.8 SECONDS (ref 23–36)
AST SERPL-CCNC: 20 U/L (ref ?–34)
ATRIAL RATE: 52 BPM
ATRIAL RATE: 62 BPM
ATRIAL RATE: 67 BPM
BASOPHILS # BLD AUTO: 0.06 X10(3) UL (ref 0–0.2)
BASOPHILS NFR BLD AUTO: 0.8 %
BILIRUB SERPL-MCNC: 0.7 MG/DL (ref 0.2–1.1)
BUN BLD-MCNC: 15 MG/DL (ref 9–23)
CALCIUM BLD-MCNC: 9.8 MG/DL (ref 8.7–10.6)
CHLORIDE SERPL-SCNC: 105 MMOL/L (ref 98–112)
CHOLEST SERPL-MCNC: 232 MG/DL (ref ?–200)
CO2 SERPL-SCNC: 25 MMOL/L (ref 21–32)
CREAT BLD-MCNC: 1.23 MG/DL (ref 0.7–1.3)
EGFRCR SERPLBLD CKD-EPI 2021: 61 ML/MIN/1.73M2 (ref 60–?)
EOSINOPHIL # BLD AUTO: 0.43 X10(3) UL (ref 0–0.7)
EOSINOPHIL NFR BLD AUTO: 5.9 %
ERYTHROCYTE [DISTWIDTH] IN BLOOD BY AUTOMATED COUNT: 11.9 %
GLOBULIN PLAS-MCNC: 2.9 G/DL (ref 2–3.5)
GLUCOSE BLD-MCNC: 113 MG/DL (ref 70–99)
HCT VFR BLD AUTO: 49.8 % (ref 39–53)
HDLC SERPL-MCNC: 55 MG/DL (ref 40–59)
HGB BLD-MCNC: 17.3 G/DL (ref 13–17.5)
IMM GRANULOCYTES # BLD AUTO: 0.01 X10(3) UL (ref 0–1)
IMM GRANULOCYTES NFR BLD: 0.1 %
LDLC SERPL CALC-MCNC: 158 MG/DL (ref ?–100)
LYMPHOCYTES # BLD AUTO: 2.97 X10(3) UL (ref 1–4)
LYMPHOCYTES NFR BLD AUTO: 41 %
MCH RBC QN AUTO: 30.5 PG (ref 26–34)
MCHC RBC AUTO-ENTMCNC: 34.7 G/DL (ref 31–37)
MCV RBC AUTO: 87.8 FL (ref 80–100)
MONOCYTES # BLD AUTO: 0.72 X10(3) UL (ref 0.1–1)
MONOCYTES NFR BLD AUTO: 9.9 %
MRSA DNA SPEC QL NAA+PROBE: NEGATIVE
NEUTROPHILS # BLD AUTO: 3.06 X10 (3) UL (ref 1.5–7.7)
NEUTROPHILS # BLD AUTO: 3.06 X10(3) UL (ref 1.5–7.7)
NEUTROPHILS NFR BLD AUTO: 42.3 %
NONHDLC SERPL-MCNC: 177 MG/DL (ref ?–130)
OSMOLALITY SERPL CALC.SUM OF ELEC: 294 MOSM/KG (ref 275–295)
P AXIS: 23 DEGREES
P AXIS: 49 DEGREES
P AXIS: 55 DEGREES
P-R INTERVAL: 178 MS
P-R INTERVAL: 220 MS
P-R INTERVAL: 226 MS
PLATELET # BLD AUTO: 269 10(3)UL (ref 150–450)
POTASSIUM SERPL-SCNC: 4.1 MMOL/L (ref 3.5–5.1)
PROT SERPL-MCNC: 7.4 G/DL (ref 5.7–8.2)
Q-T INTERVAL: 398 MS
Q-T INTERVAL: 412 MS
Q-T INTERVAL: 440 MS
QRS DURATION: 108 MS
QRS DURATION: 108 MS
QRS DURATION: 112 MS
QTC CALCULATION (BEZET): 403 MS
QTC CALCULATION (BEZET): 409 MS
QTC CALCULATION (BEZET): 435 MS
R AXIS: -33 DEGREES
R AXIS: -38 DEGREES
R AXIS: -44 DEGREES
RBC # BLD AUTO: 5.67 X10(6)UL (ref 3.8–5.8)
SODIUM SERPL-SCNC: 141 MMOL/L (ref 136–145)
T AXIS: -6 DEGREES
T AXIS: 28 DEGREES
T AXIS: 75 DEGREES
TRIGL SERPL-MCNC: 108 MG/DL (ref 30–149)
TROPONIN I SERPL HS-MCNC: 4112 NG/L (ref ?–53)
TROPONIN I SERPL HS-MCNC: 94 NG/L (ref ?–53)
VENTRICULAR RATE: 52 BPM
VENTRICULAR RATE: 62 BPM
VENTRICULAR RATE: 67 BPM
VLDLC SERPL CALC-MCNC: 21 MG/DL (ref 0–30)
WBC # BLD AUTO: 7.3 X10(3) UL (ref 4–11)

## 2025-08-09 PROCEDURE — 80053 COMPREHEN METABOLIC PANEL: CPT | Performed by: EMERGENCY MEDICINE

## 2025-08-09 PROCEDURE — B2131ZZ FLUOROSCOPY OF MULTIPLE CORONARY ARTERY BYPASS GRAFTS USING LOW OSMOLAR CONTRAST: ICD-10-PCS | Performed by: INTERNAL MEDICINE

## 2025-08-09 PROCEDURE — 4A023N7 MEASUREMENT OF CARDIAC SAMPLING AND PRESSURE, LEFT HEART, PERCUTANEOUS APPROACH: ICD-10-PCS | Performed by: INTERNAL MEDICINE

## 2025-08-09 PROCEDURE — 87641 MR-STAPH DNA AMP PROBE: CPT | Performed by: INTERNAL MEDICINE

## 2025-08-09 PROCEDURE — 93005 ELECTROCARDIOGRAM TRACING: CPT

## 2025-08-09 PROCEDURE — 84484 ASSAY OF TROPONIN QUANT: CPT | Performed by: INTERNAL MEDICINE

## 2025-08-09 PROCEDURE — 99285 EMERGENCY DEPT VISIT HI MDM: CPT

## 2025-08-09 PROCEDURE — 99152 MOD SED SAME PHYS/QHP 5/>YRS: CPT | Performed by: INTERNAL MEDICINE

## 2025-08-09 PROCEDURE — 99291 CRITICAL CARE FIRST HOUR: CPT

## 2025-08-09 PROCEDURE — 85025 COMPLETE CBC W/AUTO DIFF WBC: CPT | Performed by: EMERGENCY MEDICINE

## 2025-08-09 PROCEDURE — 84484 ASSAY OF TROPONIN QUANT: CPT | Performed by: EMERGENCY MEDICINE

## 2025-08-09 PROCEDURE — 96365 THER/PROPH/DIAG IV INF INIT: CPT

## 2025-08-09 PROCEDURE — 80061 LIPID PANEL: CPT | Performed by: EMERGENCY MEDICINE

## 2025-08-09 PROCEDURE — 99153 MOD SED SAME PHYS/QHP EA: CPT | Performed by: INTERNAL MEDICINE

## 2025-08-09 PROCEDURE — 93010 ELECTROCARDIOGRAM REPORT: CPT | Performed by: INTERNAL MEDICINE

## 2025-08-09 PROCEDURE — B2111ZZ FLUOROSCOPY OF MULTIPLE CORONARY ARTERIES USING LOW OSMOLAR CONTRAST: ICD-10-PCS | Performed by: INTERNAL MEDICINE

## 2025-08-09 PROCEDURE — 85730 THROMBOPLASTIN TIME PARTIAL: CPT | Performed by: EMERGENCY MEDICINE

## 2025-08-09 PROCEDURE — 93459 L HRT ART/GRFT ANGIO: CPT | Performed by: INTERNAL MEDICINE

## 2025-08-09 PROCEDURE — 51701 INSERT BLADDER CATHETER: CPT

## 2025-08-09 PROCEDURE — 71045 X-RAY EXAM CHEST 1 VIEW: CPT | Performed by: EMERGENCY MEDICINE

## 2025-08-09 PROCEDURE — 027034Z DILATION OF CORONARY ARTERY, ONE ARTERY WITH DRUG-ELUTING INTRALUMINAL DEVICE, PERCUTANEOUS APPROACH: ICD-10-PCS | Performed by: INTERNAL MEDICINE

## 2025-08-09 PROCEDURE — 93010 ELECTROCARDIOGRAM REPORT: CPT

## 2025-08-09 RX ORDER — HEPARIN SODIUM AND DEXTROSE 10000; 5 [USP'U]/100ML; G/100ML
12 INJECTION INTRAVENOUS ONCE
Status: DISCONTINUED | OUTPATIENT
Start: 2025-08-09 | End: 2025-08-09

## 2025-08-09 RX ORDER — IOPAMIDOL 755 MG/ML
100 INJECTION, SOLUTION INTRAVASCULAR
Status: COMPLETED | OUTPATIENT
Start: 2025-08-09 | End: 2025-08-09

## 2025-08-09 RX ORDER — HEPARIN SODIUM 5000 [USP'U]/ML
INJECTION, SOLUTION INTRAVENOUS; SUBCUTANEOUS
Status: DISCONTINUED
Start: 2025-08-09 | End: 2025-08-09 | Stop reason: WASHOUT

## 2025-08-09 RX ORDER — METOPROLOL TARTRATE 1 MG/ML
5 INJECTION, SOLUTION INTRAVENOUS ONCE
Status: DISCONTINUED | OUTPATIENT
Start: 2025-08-09 | End: 2025-08-09

## 2025-08-09 RX ORDER — BIVALIRUDIN 250 MG/5ML
INJECTION, POWDER, LYOPHILIZED, FOR SOLUTION INTRAVENOUS
Status: COMPLETED
Start: 2025-08-09 | End: 2025-08-09

## 2025-08-09 RX ORDER — LIDOCAINE HYDROCHLORIDE 10 MG/ML
INJECTION, SOLUTION EPIDURAL; INFILTRATION; INTRACAUDAL; PERINEURAL
Status: COMPLETED
Start: 2025-08-09 | End: 2025-08-09

## 2025-08-09 RX ORDER — TICAGRELOR 90 MG/1
TABLET, FILM COATED ORAL
Status: COMPLETED
Start: 2025-08-09 | End: 2025-08-09

## 2025-08-09 RX ORDER — HEPARIN SODIUM 1000 [USP'U]/ML
5000 INJECTION, SOLUTION INTRAVENOUS; SUBCUTANEOUS ONCE
Status: COMPLETED | OUTPATIENT
Start: 2025-08-09 | End: 2025-08-09

## 2025-08-09 RX ORDER — MIDAZOLAM HYDROCHLORIDE 1 MG/ML
INJECTION INTRAMUSCULAR; INTRAVENOUS
Status: COMPLETED
Start: 2025-08-09 | End: 2025-08-09

## 2025-08-09 RX ORDER — ASPIRIN 81 MG/1
81 TABLET ORAL DAILY
Status: DISCONTINUED | OUTPATIENT
Start: 2025-08-10 | End: 2025-08-11

## 2025-08-09 RX ORDER — AMPICILLIN TRIHYDRATE 250 MG
600 CAPSULE ORAL DAILY
COMMUNITY

## 2025-08-09 RX ORDER — TICAGRELOR 90 MG/1
90 TABLET, FILM COATED ORAL EVERY 12 HOURS
Status: DISCONTINUED | OUTPATIENT
Start: 2025-08-10 | End: 2025-08-11

## 2025-08-09 RX ORDER — HEPARIN SODIUM AND DEXTROSE 10000; 5 [USP'U]/100ML; G/100ML
INJECTION INTRAVENOUS CONTINUOUS
Status: DISCONTINUED | OUTPATIENT
Start: 2025-08-09 | End: 2025-08-09

## 2025-08-09 RX ORDER — LISINOPRIL 10 MG/1
10 TABLET ORAL DAILY
Status: DISCONTINUED | OUTPATIENT
Start: 2025-08-09 | End: 2025-08-11

## 2025-08-09 RX ORDER — NITROGLYCERIN 20 MG/100ML
INJECTION INTRAVENOUS
Status: DISCONTINUED | OUTPATIENT
Start: 2025-08-09 | End: 2025-08-11

## 2025-08-09 RX ORDER — SODIUM CHLORIDE 9 MG/ML
INJECTION, SOLUTION INTRAVENOUS CONTINUOUS
Status: ACTIVE | OUTPATIENT
Start: 2025-08-09 | End: 2025-08-09

## 2025-08-10 ENCOUNTER — APPOINTMENT (OUTPATIENT)
Dept: CV DIAGNOSTICS | Facility: HOSPITAL | Age: 75
End: 2025-08-10
Attending: INTERNAL MEDICINE

## 2025-08-10 LAB
ANION GAP SERPL CALC-SCNC: 12 MMOL/L (ref 0–18)
ATRIAL RATE: 59 BPM
BUN BLD-MCNC: 13 MG/DL (ref 9–23)
CALCIUM BLD-MCNC: 9.1 MG/DL (ref 8.7–10.6)
CHLORIDE SERPL-SCNC: 108 MMOL/L (ref 98–112)
CO2 SERPL-SCNC: 22 MMOL/L (ref 21–32)
CREAT BLD-MCNC: 0.95 MG/DL (ref 0.7–1.3)
EGFRCR SERPLBLD CKD-EPI 2021: 83 ML/MIN/1.73M2 (ref 60–?)
ERYTHROCYTE [DISTWIDTH] IN BLOOD BY AUTOMATED COUNT: 11.9 %
GLUCOSE BLD-MCNC: 98 MG/DL (ref 70–99)
HCT VFR BLD AUTO: 46.5 % (ref 39–53)
HGB BLD-MCNC: 16.6 G/DL (ref 13–17.5)
MCH RBC QN AUTO: 30.3 PG (ref 26–34)
MCHC RBC AUTO-ENTMCNC: 35.7 G/DL (ref 31–37)
MCV RBC AUTO: 85 FL (ref 80–100)
OSMOLALITY SERPL CALC.SUM OF ELEC: 294 MOSM/KG (ref 275–295)
P AXIS: -14 DEGREES
P-R INTERVAL: 170 MS
PLATELET # BLD AUTO: 241 10(3)UL (ref 150–450)
POTASSIUM SERPL-SCNC: 4 MMOL/L (ref 3.5–5.1)
Q-T INTERVAL: 418 MS
QRS DURATION: 110 MS
QTC CALCULATION (BEZET): 413 MS
R AXIS: -36 DEGREES
RBC # BLD AUTO: 5.47 X10(6)UL (ref 3.8–5.8)
SODIUM SERPL-SCNC: 142 MMOL/L (ref 136–145)
T AXIS: -9 DEGREES
TROPONIN I SERPL HS-MCNC: 9073 NG/L (ref ?–53)
VENTRICULAR RATE: 59 BPM
WBC # BLD AUTO: 7.6 X10(3) UL (ref 4–11)

## 2025-08-10 PROCEDURE — 93306 TTE W/DOPPLER COMPLETE: CPT | Performed by: INTERNAL MEDICINE

## 2025-08-10 PROCEDURE — 80048 BASIC METABOLIC PNL TOTAL CA: CPT | Performed by: INTERNAL MEDICINE

## 2025-08-10 PROCEDURE — 85027 COMPLETE CBC AUTOMATED: CPT | Performed by: INTERNAL MEDICINE

## 2025-08-10 PROCEDURE — 84484 ASSAY OF TROPONIN QUANT: CPT | Performed by: INTERNAL MEDICINE

## 2025-08-10 PROCEDURE — 93005 ELECTROCARDIOGRAM TRACING: CPT

## 2025-08-10 PROCEDURE — 93010 ELECTROCARDIOGRAM REPORT: CPT | Performed by: INTERNAL MEDICINE

## 2025-08-11 VITALS
SYSTOLIC BLOOD PRESSURE: 104 MMHG | HEART RATE: 71 BPM | OXYGEN SATURATION: 95 % | DIASTOLIC BLOOD PRESSURE: 67 MMHG | WEIGHT: 185.31 LBS | BODY MASS INDEX: 24 KG/M2 | TEMPERATURE: 98 F | RESPIRATION RATE: 14 BRPM

## 2025-08-11 LAB
ANION GAP SERPL CALC-SCNC: 11 MMOL/L (ref 0–18)
BASOPHILS # BLD AUTO: 0.03 X10(3) UL (ref 0–0.2)
BASOPHILS NFR BLD AUTO: 0.4 %
BUN BLD-MCNC: 17 MG/DL (ref 9–23)
CALCIUM BLD-MCNC: 8.9 MG/DL (ref 8.7–10.6)
CHLORIDE SERPL-SCNC: 109 MMOL/L (ref 98–112)
CO2 SERPL-SCNC: 22 MMOL/L (ref 21–32)
CREAT BLD-MCNC: 0.94 MG/DL (ref 0.7–1.3)
EGFRCR SERPLBLD CKD-EPI 2021: 84 ML/MIN/1.73M2 (ref 60–?)
EOSINOPHIL # BLD AUTO: 0.6 X10(3) UL (ref 0–0.7)
EOSINOPHIL NFR BLD AUTO: 8.9 %
ERYTHROCYTE [DISTWIDTH] IN BLOOD BY AUTOMATED COUNT: 11.9 %
GLUCOSE BLD-MCNC: 96 MG/DL (ref 70–99)
HCT VFR BLD AUTO: 46.5 % (ref 39–53)
HGB BLD-MCNC: 16.1 G/DL (ref 13–17.5)
IMM GRANULOCYTES # BLD AUTO: 0.01 X10(3) UL (ref 0–1)
IMM GRANULOCYTES NFR BLD: 0.1 %
LYMPHOCYTES # BLD AUTO: 1.56 X10(3) UL (ref 1–4)
LYMPHOCYTES NFR BLD AUTO: 23 %
MCH RBC QN AUTO: 30.4 PG (ref 26–34)
MCHC RBC AUTO-ENTMCNC: 34.6 G/DL (ref 31–37)
MCV RBC AUTO: 87.7 FL (ref 80–100)
MONOCYTES # BLD AUTO: 0.86 X10(3) UL (ref 0.1–1)
MONOCYTES NFR BLD AUTO: 12.7 %
NEUTROPHILS # BLD AUTO: 3.71 X10 (3) UL (ref 1.5–7.7)
NEUTROPHILS # BLD AUTO: 3.71 X10(3) UL (ref 1.5–7.7)
NEUTROPHILS NFR BLD AUTO: 54.9 %
OSMOLALITY SERPL CALC.SUM OF ELEC: 295 MOSM/KG (ref 275–295)
PLATELET # BLD AUTO: 221 10(3)UL (ref 150–450)
POTASSIUM SERPL-SCNC: 4 MMOL/L (ref 3.5–5.1)
RBC # BLD AUTO: 5.3 X10(6)UL (ref 3.8–5.8)
SODIUM SERPL-SCNC: 142 MMOL/L (ref 136–145)
WBC # BLD AUTO: 6.8 X10(3) UL (ref 4–11)

## 2025-08-11 PROCEDURE — 99211 OFF/OP EST MAY X REQ PHY/QHP: CPT

## 2025-08-11 PROCEDURE — 80048 BASIC METABOLIC PNL TOTAL CA: CPT | Performed by: STUDENT IN AN ORGANIZED HEALTH CARE EDUCATION/TRAINING PROGRAM

## 2025-08-11 PROCEDURE — 85025 COMPLETE CBC W/AUTO DIFF WBC: CPT | Performed by: STUDENT IN AN ORGANIZED HEALTH CARE EDUCATION/TRAINING PROGRAM

## 2025-08-11 RX ORDER — TICAGRELOR 90 MG/1
90 TABLET, FILM COATED ORAL EVERY 12 HOURS
Qty: 180 TABLET | Refills: 3 | Status: SHIPPED | OUTPATIENT
Start: 2025-08-11

## 2025-08-11 RX ORDER — LISINOPRIL 10 MG/1
10 TABLET ORAL DAILY
Qty: 90 TABLET | Refills: 3 | Status: SHIPPED | OUTPATIENT
Start: 2025-08-12

## (undated) NOTE — LETTER
[facility] [address], [address]  Consent for Procedure/Sedation    Date: 9/22/22    Time: 18:00      1. I hereby authorize , my physician and his/her assistants (if applicable), which may include medical students, residents, and/or fellows, to perform the following surgical operation/ procedure and administer such anesthesia as may be determined necessary by my physician:  Operation/Procedure name (s) Cardiac Catheterization, Left Ventricular Cineangiography, Bilateral Selective Coronary Angiography and/or Right Heart Catheterization; possible Percutaneous Transluminal Coronary Angioplasty, Coronary Atherectomy, Coronary Stent, Intracoronary Thrombolytic therapy, Antiplatelet therapy and/or Intravascular Ultrasound on Ricky Lopez  2. I recognize that during the surgical operation/procedure, unforeseen conditions may necessitate additional or different procedures than those listed above. I, therefore, further authorize and request that the above-named surgeon, assistants, or designees perform such procedures as are, in their judgment, necessary and desirable. 3.   My surgeon/physician has discussed prior to my surgery the potential benefits, risks and side effects of this procedure; the likelihood of achieving goals; and potential problems that might occur during recuperation. They also discussed reasonable alternatives to the procedure, including risks, benefits, and side effects related to the alternatives and risks related to not receiving this procedure. I have had all my questions answered and I acknowledge that no guarantee has been made as to the result that may be obtained. 4.   Should the need arise during my operation or immediate post-operative period, I also consent to the administration of blood and/or blood products.   Further, I understand that despite careful testing and screening of blood or blood products by collecting agencies, I may still be subject to ill effects as a result of receiving a blood transfusion and/or blood products. The following are some, but not all, of the potential risks that can occur: fever and allergic reactions, hemolytic reactions, transmission of diseases such as Hepatitis, AIDS and Cytomegalovirus (CMV) and fluid overload. In the event that I wish to have an autologous transfusion of my own blood, or a directed donor transfusion. I will discuss this with my physician. 5.   I authorize the use of any specimen, organs, tissues, body parts or foreign objects that may be removed from my body during the operation/procedure for diagnosis, research or teaching purposes and their subsequent disposal by hospital authorities. I also authorize the release of specimen test results and/or written reports to my treating physician on the hospital medical staff or other referring or consulting physicians involved in my care, at the discretion of the Pathologist or my treating physician. 6.   I consent to the photographing or videotaping of the operations or procedures to be performed, including appropriate portions of my body for medical, scientific, or educational purposes, provided my identity is not revealed by the pictures or by descriptive texts accompanying them. If the procedure has been photographed/videotaped, the surgeon will obtain the original picture, image, videotape or CD. The hospital will not be responsible for storage, release or maintenance of the picture, image, tape or CD.    7.   I consent to the presence of a  or observers in the operating room as deemed necessary by my physician or their designees. 8.   I recognize that in the event my procedure results in extended X-Ray/fluoroscopy time, I may develop a skin reaction. 9.  If I have a Do Not Attempt Resuscitation (DNAR) order in place, that status will be suspended while in the operating room, procedural suite, and during the recovery period unless otherwise explicitly stated by me (or a person authorized to consent on my behalf). The surgeon or my attending physician will determine when the applicable recovery period ends for purposes of reinstating the DNAR order. 10. Patients having a sterilization procedure: I understand that if the procedure is successful the results will be permanent and it will therefore be impossible for me to inseminate, conceive, or bear children. I also understand that the procedure is intended to result in sterility, although the result has not been guaranteed. 11. I acknowledge that my physician has explained sedation/analgesia administration to me including the risk and benefits I consent to the administration of sedation/analgesia as may be necessary or desirable in the judgment of my physician.     I CERTIFY THAT I HAVE READ AND FULLY UNDERSTAND THE ABOVE CONSENT TO OPERATION and/or OTHER PROCEDURE.      _________________________________________  __________________________________  Signature of Patient     Signature of Responsible Person         ___________________________________         Printed Name of Responsible Person           _________________________________                 Relationship to Patient  _________________________________________  ______________________________  Signature of Witness          Date  Time      Patient Name: Tessy Ferreira     : 1950                 Printed: 2022     Medical Record #: [id number]                     Page 1 of 1